# Patient Record
Sex: MALE | Race: WHITE | Employment: UNEMPLOYED | URBAN - METROPOLITAN AREA
[De-identification: names, ages, dates, MRNs, and addresses within clinical notes are randomized per-mention and may not be internally consistent; named-entity substitution may affect disease eponyms.]

---

## 2017-01-12 ENCOUNTER — APPOINTMENT (OUTPATIENT)
Dept: PHYSICAL THERAPY | Age: 67
End: 2017-01-12

## 2017-01-13 ENCOUNTER — APPOINTMENT (OUTPATIENT)
Dept: PHYSICAL THERAPY | Age: 67
End: 2017-01-13

## 2017-01-16 ENCOUNTER — HOSPITAL ENCOUNTER (OUTPATIENT)
Dept: PHYSICAL THERAPY | Age: 67
End: 2017-01-16
Payer: MEDICARE

## 2017-01-23 ENCOUNTER — APPOINTMENT (OUTPATIENT)
Dept: PHYSICAL THERAPY | Age: 67
End: 2017-01-23
Payer: MEDICARE

## 2017-01-24 ENCOUNTER — HOSPITAL ENCOUNTER (OUTPATIENT)
Dept: PHYSICAL THERAPY | Age: 67
Discharge: HOME | End: 2017-01-24
Payer: MEDICARE

## 2017-01-24 PROCEDURE — G8978 MOBILITY CURRENT STATUS: HCPCS

## 2017-01-24 PROCEDURE — 97162 PT EVAL MOD COMPLEX 30 MIN: CPT

## 2017-01-24 PROCEDURE — G8979 MOBILITY GOAL STATUS: HCPCS

## 2017-01-24 PROCEDURE — 97110 THERAPEUTIC EXERCISES: CPT
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
## 2017-01-26 ENCOUNTER — HOSPITAL ENCOUNTER (OUTPATIENT)
Dept: PHYSICAL THERAPY | Age: 67
Discharge: HOME | End: 2017-01-26
Payer: MEDICARE

## 2017-01-26 PROCEDURE — 97110 THERAPEUTIC EXERCISES: CPT
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
## 2017-01-30 ENCOUNTER — HOSPITAL ENCOUNTER (OUTPATIENT)
Dept: PHYSICAL THERAPY | Age: 67
Discharge: HOME | End: 2017-01-30
Payer: MEDICARE

## 2017-01-30 PROCEDURE — 97110 THERAPEUTIC EXERCISES: CPT

## 2017-01-30 NOTE — PROGRESS NOTES
PT DAILY TREATMENT NOTE     Patient Name: Kalpesh Monge. Date:2017  : 1950  [x]  Patient  Verified  Payor: VA MEDICARE / Plan: VA MEDICARE PART A & B / Product Type: Medicare /    In time:1:07  Out time:1:57  Total Treatment Time (min): 50  Total Timed Codes (min): 40  1:1 Treatment Time (min): 40   Visit #: 3 of 10    Treatment Area: Lower back pain [M54.5]    SUBJECTIVE  Pain Level (0-10 scale): 8/10  Any medication changes, allergies to medications, adverse drug reactions, diagnosis change, or new procedure performed?: [x] No    [] Yes (see summary sheet for update)  Subjective functional status/changes:   [] No changes reported  My back is really hurting today, but I want to do as much as I can to get my back at least feeling a little better.     OBJECTIVE  Modality rationale: decrease pain and increase tissue extensibility to improve the patients ability to improve functional abilities   Min Type Additional Details    [] Estim: []Att   []Unatt        []TENS instruct                  []IFC  []Premod   []NMES                     []Other:  []w/US   []w/ice   []w/heat  Position:  Location:    []  Traction: [] Cervical       []Lumbar                       [] Prone          []Supine                       []Intermittent   []Continuous Lbs:  [] before manual  [] after manual    []  Ultrasound: []Continuous   [] Pulsed                           []1MHz   []3MHz Location:  W/cm2:    []  Iontophoresis with dexamethasone         Location: [] Take home patch   [] In clinic   10 []  Ice     [x]  heat  []  Ice massage Position:seated  Location:lumbar    []  Vasopneumatic Device Pressure:       [] lo [] med [] hi   Temperature: [] lo [] med [] hi   [] Skin assessment post-treatment:  []intact []redness- no adverse reaction       []redness - adverse reaction:       40 min Therapeutic Exercise:  [x] See flow sheet :   Rationale: increase strength, improve coordination, improve balance and increase proprioception to improve the patients ability to improve functional abilities           min Patient Education: [x] Review HEP    [] Progressed/Changed HEP based on:   [] positioning   [] body mechanics   [] transfers   [] heat/ice application        Other Objective/Functional Measures:     Pain Level (0-10 scale) post treatment: 5/10    ASSESSMENT/Changes in Function: Pt presented with continued decreased standing tolerance with therex as well as reporting limited standing/walking endurance to 10 minutes maximum before increasing lumbar pain level to intolerable level. Will continue to progress/advance patient within current POC as tolerated with monitoring symptoms. Patient will continue to benefit from skilled PT services to modify and progress therapeutic interventions, address functional mobility deficits, address ROM deficits, address strength deficits and analyze and cue movement patterns to attain remaining goals.      []  See Plan of Care  []  See progress note/recertification  []  See Discharge Summary         Progress towards goals / Updated goals:      PLAN  [x]  Upgrade activities as tolerated     []  Continue plan of care  []  Update interventions per flow sheet       []  Discharge due to:_  []  Other:_      Deepa Olmos PTA 1/30/2017  12:54 PM

## 2017-02-02 ENCOUNTER — HOSPITAL ENCOUNTER (OUTPATIENT)
Dept: PHYSICAL THERAPY | Age: 67
Discharge: HOME | End: 2017-02-02
Payer: MEDICARE

## 2017-02-02 PROCEDURE — 97110 THERAPEUTIC EXERCISES: CPT

## 2017-02-07 ENCOUNTER — HOSPITAL ENCOUNTER (OUTPATIENT)
Dept: PHYSICAL THERAPY | Age: 67
Discharge: HOME | End: 2017-02-07
Payer: MEDICARE

## 2017-02-07 PROCEDURE — 97110 THERAPEUTIC EXERCISES: CPT

## 2017-02-07 NOTE — PROGRESS NOTES
PT DAILY TREATMENT NOTE 8-    Patient Name: Kalpesh Monge.   Date:2017  : 1950  [x]  Patient  Verified  Payor: VA MEDICARE / Plan: VA MEDICARE PART A & B / Product Type: Medicare /    In time:100  Out time:150  Total Treatment Time (min): 50  Total Timed Codes (min): 40  1:1 Treatment Time (min):40   Visit #: 5 of 10    Treatment Area: Lower back pain [M54.5]    SUBJECTIVE  Pain Level (0-10 scale): 4-5  Any medication changes, allergies to medications, adverse drug reactions, diagnosis change, or new procedure performed?: [x] No    [] Yes (see summary sheet for update)  Subjective functional status/changes:   [] No changes reported  Pt reports he will see the spine specialist over the next week and possibly get injections in the L/S.     OBJECTIVE  Modality rationale: decrease pain and increase tissue extensibility to improve the patients ability to improve functional abilities   Min Type Additional Details    [] Estim: []Att   []Unatt        []TENS instruct                  []IFC  []Premod   []NMES                     []Other:  []w/US   []w/ice   []w/heat  Position:  Location:    []  Traction: [] Cervical       []Lumbar                       [] Prone          []Supine                       []Intermittent   []Continuous Lbs:  [] before manual  [] after manual    []  Ultrasound: []Continuous   [] Pulsed                           []1MHz   []3MHz Location:  W/cm2:    []  Iontophoresis with dexamethasone         Location: [] Take home patch   [] In clinic   10 []  Ice     [x]  heat  []  Ice massage Position:seated  Location:lumbar    []  Vasopneumatic Device Pressure:       [] lo [] med [] hi   Temperature: [] lo [] med [] hi   [] Skin assessment post-treatment:  []intact []redness- no adverse reaction       []redness - adverse reaction:       40 min Therapeutic Exercise:  [x] See flow sheet :   Rationale: increase ROM and increase strength to improve the patients ability to improve functional abilities           min Patient Education: [x] Review HEP    [] Progressed/Changed HEP based on:   [] positioning   [] body mechanics   [] transfers   [] heat/ice application        Other Objective/Functional Measures:     Pain Level (0-10 scale) post treatment: 5    ASSESSMENT/Changes in Function: Pt had a near fall in the clinic with a LOB at the water cooler while getting water, however was able to catch himself on the cooler without falling. Pt reported his \"back gave out\"Pt also was bleeding from several spots on his arm and hands today when he came into therapy. Pt was also moderately to severely fatigued today with all standing therex, and was modified to seated exercises today due to fatigue and risk of falling. Patient will continue to benefit from skilled PT services to modify and progress therapeutic interventions, address functional mobility deficits, address ROM deficits, address strength deficits, analyze and cue movement patterns, analyze and modify body mechanics/ergonomics and assess and modify postural abnormalities to attain remaining goals.      []  See Plan of Care  []  See progress note/recertification  []  See Discharge Summary         Progress towards goals / Updated goals:      PLAN  [x]  Upgrade activities as tolerated     []  Continue plan of care  []  Update interventions per flow sheet       []  Discharge due to:_  []  Other:_      Yaquelin Santana, PT 2/7/2017  1:08 PM

## 2017-02-09 ENCOUNTER — HOSPITAL ENCOUNTER (OUTPATIENT)
Dept: PHYSICAL THERAPY | Age: 67
Discharge: HOME | End: 2017-02-09
Payer: MEDICARE

## 2017-02-09 PROCEDURE — 97112 NEUROMUSCULAR REEDUCATION: CPT

## 2017-02-09 PROCEDURE — 97110 THERAPEUTIC EXERCISES: CPT

## 2017-02-09 NOTE — PROGRESS NOTES
PT DAILY TREATMENT NOTE 8-    Patient Name: Hanna Morrow. Date:2017  : 1950  [x]  Patient  Verified  Payor: Riri Bowles / Plan: VA MEDICARE PART A & B / Product Type: Medicare /    In time:1:00  Out time:2:08  Total Treatment Time (min): 68  Total Timed Codes (min): 58  1:1 Treatment Time (min): 46   Visit #: 6 of 10    Treatment Area: Lower back pain [M54.5]    SUBJECTIVE  Pain Level (0-10 scale): 5  Any medication changes, allergies to medications, adverse drug reactions, diagnosis change, or new procedure performed?: [x] No    [] Yes (see summary sheet for update)  Subjective functional status/changes:   [] No changes reported  Patient reports increased low back pain with standing. He may be getting an injection soon for pain.      OBJECTIVE  Modality rationale: decrease pain and increase tissue extensibility to improve the patients ability to change and maintain body/trunk positions     Min Type Additional Details    [] Estim: []Att   []Unatt        []TENS instruct                  []IFC  []Premod   []NMES                     []Other:  []w/US   []w/ice   []w/heat  Position:  Location:    []  Traction: [] Cervical       []Lumbar                       [] Prone          []Supine                       []Intermittent   []Continuous Lbs:  [] before manual  [] after manual    []  Ultrasound: []Continuous   [] Pulsed                           []1MHz   []3MHz Location:  W/cm2:    []  Iontophoresis with dexamethasone         Location: [] Take home patch   [] In clinic   10 []  Ice     [x]  heat  []  Ice massage Position:  Location: Lumbar    []  Vasopneumatic Device Pressure:       [] lo [] med [] hi   Temperature: [] lo [] med [] hi   [] Skin assessment post-treatment:  []intact []redness- no adverse reaction       []redness - adverse reaction:       50 min Therapeutic Exercise:  [] See flow sheet :   Rationale: increase ROM and increase strength to improve the patients ability to change and maintain body/trunk positions,       8 min Neuromuscular re-education:  Static standing balance, eyes open and closed with CTG A   Rationale: Improve balance to increase standing/gait safety    Pain Level (0-10 scale) post treatment: 4    ASSESSMENT/Changes in Function: Pt experienced another near fall today while being guarded standing in parallel bars due to legs giving out. Patient must be considered a moderate to severe fall risk and should not engage in any standing therapy activities without constant guarding with a gait belt. Pt also had skin breaks near the bend in his elbows after using arms to hold onto parallel bars in the course of avoiding fall. Pt demonstrates extremely thin skin with poor integrity- he was dressed with gauze and low adhesive paper tape and verbalized no further complaints or pain, stating this happens all the time with his skin. Patient will continue to benefit from skilled PT services to modify and progress therapeutic interventions, address functional mobility deficits, address ROM deficits and address strength deficits to attain remaining goals.      []  See Plan of Care  []  See progress note/recertification  []  See Discharge Summary         PLAN  []  Upgrade activities as tolerated     [x]  Continue plan of care  []  Update interventions per flow sheet       []  Discharge due to:_  []  Other:_      Ashleigh Pierson, PT 2/9/2017  1:01 PM

## 2017-02-14 ENCOUNTER — HOSPITAL ENCOUNTER (OUTPATIENT)
Dept: PHYSICAL THERAPY | Age: 67
Discharge: HOME | End: 2017-02-14
Payer: MEDICARE

## 2017-02-14 PROCEDURE — G8980 MOBILITY D/C STATUS: HCPCS

## 2017-02-14 PROCEDURE — 97110 THERAPEUTIC EXERCISES: CPT

## 2017-02-14 PROCEDURE — G8979 MOBILITY GOAL STATUS: HCPCS

## 2017-02-14 NOTE — PROGRESS NOTES
[facility] AT [place] [address], [place], [address]  Phone: [phone]   [phone]  DISCHARGE SUMMARY  Patient Name: Rufino Tan : 1950   Treatment/Medical Diagnosis: Lower back pain [M54.5]   Referral Source: Jennifer Dover MD     Date of Initial Visit: 17 Attended Visits: 7 Missed Visits: 0     SUMMARY OF TREATMENT  Pt was seen on 17 for an initial evaluation for Low Back . Pt has been seen for 7 visits and therapy has included: therapeutic exercise, modalities for pain control, patient education and HEP. CURRENT STATUS  Pt has been seen for 7 visits since his IE on 17. Pt has had extreme difficulty over the last several visits with back and LE pain, as well as several incidents of his legs giving out on him while standing. Pt has has 2 near falls in therapy with therapist guarding and use of //bars. Pt continues to also have extremee fatigue in therapy with all standing and walking in the clinic. Pt is not appropriate or safe at this time for OPPT, and would benefit greatly from transitioning to HHPT at this time. Patient will be DC from therapy at this time    · Short Term Goals: To be accomplished in 1-2 weeks:  1. Pt to demonstrate independence with HEP to improve functional mobility for ADLs- goal met. 2. Pt will report 50% overall improvement with bed mobility in order to improve functional ability to perform ADL's- goal not met     Long Term Goals: To be accomplished in 8-10 treatments:  1. Improve FOTO outcome score by 10-15% in order to indicate a significant improvement in ADL function.- goal not met  2. Pt to report +5 or > on GROC to improve functional mobility for ADLs. - goal not met  3. Pt to demonstrate 25% improvement with L/S AROM limitations in order to improve functional mobility for ADLs- goal not met  4.  Pt will report 75% overall improvement in functional ADL's in order to return to PLOF- goal not met    GCODES: Mobility   Goal  CK= 40-59%  D/C  CL= 60-79%. The severity rating is based on the FOTO Score      RECOMMENDATIONS  Other: Patient is not appropriate for OPPT at this time secondary to safety concerns and falls risk. Patient will be DC from therapy at this time. If you have any questions/comments please contact us directly at [phone]. Thank you for allowing us to assist in the care of your patient.     Therapist Signature: Art Morris PT Date: 2/14/2017   Reporting Period: 1/24/17-2/14/2017 Time: 1:20 PM

## 2017-02-14 NOTE — PROGRESS NOTES
PT DAILY TREATMENT NOTE     Patient Name: Mikayla Manning.   Date:2017  : 1950  [x]  Patient  Verified  [place] / Plan: [place] & B / Product Type: Medicare /    In time:100  Out time:200  Total Treatment Time (min): 60  Total Timed Codes (min): 50  1:1 Treatment Time (min): 50   Visit #: 7 of 10    Treatment Area: Lower back pain [M54.5]    SUBJECTIVE  Pain Level (0-10 scale): 10  Any medication changes, allergies to medications, adverse drug reactions, diagnosis change, or new procedure performed?: [x] No    [] Yes (see summary sheet for update)  Subjective functional status/changes:   [] No changes reported  SEE DC SUMMARY    OBJECTIVE  Modality rationale: decrease pain and increase tissue extensibility to improve the patients ability to change and maintain body/trunk positions     Min Type Additional Details    [] Estim: []Att   []Unatt        []TENS instruct                  []IFC  []Premod   []NMES                     []Other:  []w/US   []w/ice   []w/heat  Position:  Location:    []  Traction: [] Cervical       []Lumbar                       [] Prone          []Supine                       []Intermittent   []Continuous Lbs:  [] before manual  [] after manual    []  Ultrasound: []Continuous   [] Pulsed                           []1MHz   []3MHz Location:  W/cm2:    []  Iontophoresis with dexamethasone         Location: [] Take home patch   [] In clinic   10 []  Ice     [x]  heat  []  Ice massage Position:  Location: Lumbar    []  Vasopneumatic Device Pressure:       [] lo [] med [] hi   Temperature: [] lo [] med [] hi   [] Skin assessment post-treatment:  []intact []redness- no adverse reaction       []redness - adverse reaction:       50 min Therapeutic Exercise:  [] See flow sheet :   Rationale: increase ROM and increase strength to improve the patients ability to change and maintain body/trunk positions,       Pain Level (0-10 scale) post treatment: 10    ASSESSMENT/Changes in Function: SEE DC SUMMARY     []  See Plan of Care  []  See progress note/recertification  [x]  See Discharge Summary         PLAN  []  Upgrade activities as tolerated     []  Continue plan of care  []  Update interventions per flow sheet       [x]  Discharge due to: unsafe for OPPT at this time  []  Other:_      Fam Santana, PT 2/14/2017  1:01 PM

## 2017-02-16 ENCOUNTER — APPOINTMENT (OUTPATIENT)
Dept: PHYSICAL THERAPY | Age: 67
End: 2017-02-16
Payer: MEDICARE

## 2017-02-21 ENCOUNTER — APPOINTMENT (OUTPATIENT)
Dept: PHYSICAL THERAPY | Age: 67
End: 2017-02-21
Payer: MEDICARE

## 2017-02-21 PROBLEM — L03.115 BILATERAL LOWER LEG CELLULITIS: Status: ACTIVE | Noted: 2017-02-21

## 2017-02-21 PROBLEM — R53.1 WEAKNESS: Status: ACTIVE | Noted: 2017-02-21

## 2017-02-21 PROBLEM — L03.116 BILATERAL LOWER LEG CELLULITIS: Status: ACTIVE | Noted: 2017-02-21

## 2017-02-21 PROBLEM — R29.6 FREQUENT FALLS: Status: ACTIVE | Noted: 2017-02-21

## 2017-02-23 ENCOUNTER — APPOINTMENT (OUTPATIENT)
Dept: PHYSICAL THERAPY | Age: 67
End: 2017-02-23
Payer: MEDICARE

## 2018-06-08 NOTE — PATIENT DISCUSSION
Recommended artificial tears to use: 1 drop 4x a day in both eyes.  sample Systane Complete given today.  beyond 4x/day dosing use PF vials only so as to avoid pres toxicity.

## 2019-07-29 ENCOUNTER — IMPORTED ENCOUNTER (OUTPATIENT)
Dept: URBAN - METROPOLITAN AREA CLINIC 1 | Facility: CLINIC | Age: 69
End: 2019-07-29

## 2019-07-29 PROBLEM — Z96.1: Noted: 2019-07-29

## 2019-07-29 PROBLEM — H43.812: Noted: 2019-07-29

## 2019-07-29 PROBLEM — H11.151: Noted: 2019-07-29

## 2019-07-29 PROBLEM — H04.123: Noted: 2019-07-29

## 2019-07-29 PROCEDURE — 92015 DETERMINE REFRACTIVE STATE: CPT

## 2019-07-29 PROCEDURE — 92004 COMPRE OPH EXAM NEW PT 1/>: CPT

## 2019-07-29 NOTE — PATIENT DISCUSSION
1.  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 2.  Dry Eyes OU -- Recommended to patient to use Artificial Tears BID OU3. Pinguecula OD -- Use of sunglasses when exposed to UV light and observation is recommended. 4. Pseudophakia OU - 5. Return for an appointment in 1 year for 30. with Dr. Joni Schaumann.

## 2020-11-19 PROBLEM — L03.115 CELLULITIS OF RIGHT FOOT: Status: ACTIVE | Noted: 2020-01-01

## 2020-12-04 NOTE — PATIENT DISCUSSION
re-start SF[facility] fish oil 1 tsp per day.  try Refresh digital tears need to move to PFATs since using so often.

## 2020-12-11 PROBLEM — U07.1 COVID-19: Status: ACTIVE | Noted: 2020-01-01

## 2020-12-17 PROBLEM — A41.9 SEPSIS (HCC): Status: ACTIVE | Noted: 2020-01-01

## 2022-04-08 ASSESSMENT — VISUAL ACUITY
OS_CC: 20/25+1
OD_CC: 20/40+1

## 2022-04-08 ASSESSMENT — TONOMETRY
OD_IOP_MMHG: 15
OS_IOP_MMHG: 14